# Patient Record
Sex: FEMALE | URBAN - METROPOLITAN AREA
[De-identification: names, ages, dates, MRNs, and addresses within clinical notes are randomized per-mention and may not be internally consistent; named-entity substitution may affect disease eponyms.]

---

## 2020-03-09 ENCOUNTER — APPOINTMENT (RX ONLY)
Dept: URBAN - METROPOLITAN AREA CLINIC 310 | Facility: CLINIC | Age: 50
Setting detail: DERMATOLOGY
End: 2020-03-09

## 2020-03-09 DIAGNOSIS — L259 CONTACT DERMATITIS AND OTHER ECZEMA, UNSPECIFIED CAUSE: ICD-10-CM

## 2020-03-09 PROBLEM — L23.9 ALLERGIC CONTACT DERMATITIS, UNSPECIFIED CAUSE: Status: ACTIVE | Noted: 2020-03-09

## 2020-03-09 PROCEDURE — 95044 PATCH/APPLICATION TESTS: CPT

## 2020-03-09 PROCEDURE — ? PATCH TESTING

## 2020-03-09 NOTE — PROCEDURE: PATCH TESTING
Detail Level: None
Consent: Verbal consent obtained, risks reviewed including but not limited to rash, itching, allergic reaction, systemic rash, remote possiblity of anaphylaxis to allergen.
Post-Care Instructions: I reviewed with the patient in detail post-care instructions. Patient should not sweat, pick at, or get the patches wet for 48 hours.
Number Of Patches (Maximum Allowable Per Dos By Cms Is 90): 79

## 2020-03-09 NOTE — HPI: TESTING (PATCH TESTING)
Have You Had Previous Patch Testing In The Past?: none
Who Is Your Referring Doctor?: Dr. Jamila Vaughn
What Is Your Occupation?: Patient works from home as a  and only does work on the computer.
Additional History: Patient has never had a problem with sensitive skin or and other allergies. Patient went to [place] to visit family where she has been before and has never had any issues or reactions, Patient was given tiramcinolone and clobetasol ointment which helped minimally. In mid December she took had a hormone pellet injection which is the only thing she has changed or that is new.

## 2020-03-11 ENCOUNTER — APPOINTMENT (RX ONLY)
Dept: URBAN - METROPOLITAN AREA CLINIC 310 | Facility: CLINIC | Age: 50
Setting detail: DERMATOLOGY
End: 2020-03-11

## 2020-03-11 DIAGNOSIS — L259 CONTACT DERMATITIS AND OTHER ECZEMA, UNSPECIFIED CAUSE: ICD-10-CM

## 2020-03-11 PROBLEM — L23.9 ALLERGIC CONTACT DERMATITIS, UNSPECIFIED CAUSE: Status: ACTIVE | Noted: 2020-03-11

## 2020-03-11 PROCEDURE — 99212 OFFICE O/P EST SF 10 MIN: CPT

## 2020-03-11 PROCEDURE — ? PATCH TEST REMOVAL

## 2020-03-11 PROCEDURE — ? OTHER

## 2020-03-11 PROCEDURE — ? NORTH AMERICAN 80 PATCH TEST READING

## 2020-03-11 NOTE — PROCEDURE: [facility] [employer] 80 PATCH TEST READING
Allergen 70 Reaction: no reaction
Show Allergen Counseling In The Note?: No
Detail Level: Zone
Show Negative Results In The Note?: Yes
What Reading Time Point?: 48 hour
Number Of Patches Read: 79

## 2020-03-11 NOTE — PROCEDURE: OTHER
Other (Free Text): # Allergen Result\\n1 nickel sulfate hexahydrate -\\n2 balsam of peru (myroxylon pereirae resin) -\\n3 fragrance mix -\\n4 quaternium 15 -\\n5 neomycin sulfate -\\n6 budesonide -\\n7 cobalt (II) chloride hexahydrate -\\n8 b-mojl-bwhftrsthca formaldehyde resin -\\n9 4-phenylenediamine base -\\n10 potassium dichromate -\\n11 carba mix -\\n12 thiuram mix  -\\n13 diazolidinyl urea (Germall® II) -\\n14 paraben mix  -\\n15 black rubber mix - PPD -\\n16 imidazolidinyl urea (Germall® 115) -\\n17 mercapto mix -\\n18 tixocortol-21-pivalate -\\n19 2-mercaptobenzothiazole -\\n20 colophony -\\n21 bisphenol A epoxy resin -\\n22 ethylenediamine dihydrochloride -\\n23 amerchol L101 -\\n24 benzocaine -\\n25 bacitracin -\\n26 DMDM hydantoin -\\n27 Benzoic acid -\\n28 Ethylhexyl glycerol -\\n29 2-bromo-2-nitropropane-1,3-diol (bronopol™) -\\n30 lidocaine-HCl -\\n31 gold sodium thiosulfate -\\n32 methyldibromo glutaronitrile (MDBGN) -\\n33 disperse blue mix  -\\n34 hydrocortisone-17-butyrate -\\n35 fragrance mix II -\\n36 iodopropynyl butylcarbamate -\\n37 mixed dialkyl thioureas -\\n38 2-hydroxyethyl methacrylate (HEMA) -\\n39 decyl glucoside -\\n40 methyl methacrylate -\\n41 cinnamic aldehyde -\\n42 ethyl acrylate -\\n43 tea tree oil, oxidized -\\n44 4-chloro-3,5-xylenol (PCMX) -\\n45 propolis -\\n46 2-hydroxy-4-methoxybenzophenone (oxybenzone) -\\n47 tosylamide formaldehyde resin -\\n48 sesquiterpenelactone mix -\\n49 coconut diethanolamide (cocamide MILES) -\\n50 4-chloro-3-cresol (PCMC) -\\n51 benzalkonium chloride -\\n52 benzophenone-4 -\\n53 Polymyxin B sulfate -\\n54 sorbic acid -\\n55 cananga odorata (marianela bear) -\\n56 compositae mix -\\n57 ethyleneurea, melamine formaldehyde mix NT\\n58 sorbitan sesquioleate -\\n59 1,3-diphenylguanidine (DPG) -\\n60 cetylstearylalcohol -\\n61 Sodium benzoate -\\n62 triamcinolone acetonide -\\n63 clobetasol-17-propionate -\\n64 dl alpha tocopherol -\\n65 ethyl cyanoacrylate -\\n66 phenoxyethanol -\\n67 disperse orange-3 -\\n68 Lavender oil (lavandula angustifolia oil) -\\n69 butylhydroxytoluene (BHT) -\\n70 2-ethylhexyl-4-methoxycinnamate (octinoxate) -\\n71 benzyl alcohol -\\n72 formaldehyde -\\n73 methylchloroisothiazolinone/methylisothiazolinone -\\n74 methylisothiazolinone -\\n75 cocamidopropyl betaine -\\n76 dimethylaminopropylamine (DMAPA) -\\n77 oleamidopropyl dimethylamine -\\n78 propylene glycol -\\n79 amidoamine (stearamidopropyl dimethylamine) -\\n80 chlorhexidine digluconate - Other (Free Text): # Allergen Result\\n1 nickel sulfate hexahydrate -\\n2 balsam of peru (myroxylon pereirae resin) -\\n3 fragrance mix -\\n4 quaternium 15 -\\n5 neomycin sulfate -\\n6 budesonide -\\n7 cobalt (II) chloride hexahydrate -\\n8 n-qcif-bpjkpvfkowl formaldehyde resin -\\n9 4-phenylenediamine base -\\n10 potassium dichromate -\\n11 carba mix -\\n12 thiuram mix  -\\n13 diazolidinyl urea (Germall® II) -\\n14 paraben mix  -\\n15 black rubber mix - PPD -\\n16 imidazolidinyl urea (Germall® 115) -\\n17 mercapto mix -\\n18 tixocortol-21-pivalate -\\n19 2-mercaptobenzothiazole -\\n20 colophony -\\n21 bisphenol A epoxy resin -\\n22 ethylenediamine dihydrochloride -\\n23 amerchol L101 -\\n24 benzocaine -\\n25 bacitracin -\\n26 DMDM hydantoin -\\n27 Benzoic acid -\\n28 Ethylhexyl glycerol -\\n29 2-bromo-2-nitropropane-1,3-diol (bronopol™) -\\n30 lidocaine-HCl -\\n31 gold sodium thiosulfate -\\n32 methyldibromo glutaronitrile (MDBGN) -\\n33 disperse blue mix  -\\n34 hydrocortisone-17-butyrate -\\n35 fragrance mix II -\\n36 iodopropynyl butylcarbamate -\\n37 mixed dialkyl thioureas -\\n38 2-hydroxyethyl methacrylate (HEMA) -\\n39 decyl glucoside -\\n40 methyl methacrylate -\\n41 cinnamic aldehyde -\\n42 ethyl acrylate -\\n43 tea tree oil, oxidized -\\n44 4-chloro-3,5-xylenol (PCMX) -\\n45 propolis -\\n46 2-hydroxy-4-methoxybenzophenone (oxybenzone) -\\n47 tosylamide formaldehyde resin -\\n48 sesquiterpenelactone mix -\\n49 coconut diethanolamide (cocamide MILES) -\\n50 4-chloro-3-cresol (PCMC) -\\n51 benzalkonium chloride -\\n52 benzophenone-4 -\\n53 Polymyxin B sulfate -\\n54 sorbic acid -\\n55 cananga odorata (marianela bear) -\\n56 compositae mix -\\n57 ethyleneurea, melamine formaldehyde mix NT\\n58 sorbitan sesquioleate -\\n59 1,3-diphenylguanidine (DPG) -\\n60 cetylstearylalcohol -\\n61 Sodium benzoate -\\n62 triamcinolone acetonide -\\n63 clobetasol-17-propionate -\\n64 dl alpha tocopherol -\\n65 ethyl cyanoacrylate -\\n66 phenoxyethanol -\\n67 disperse orange-3 -\\n68 Lavender oil (lavandula angustifolia oil) -\\n69 butylhydroxytoluene (BHT) -\\n70 2-ethylhexyl-4-methoxycinnamate (octinoxate) -\\n71 benzyl alcohol -\\n72 formaldehyde -\\n73 methylchloroisothiazolinone/methylisothiazolinone -\\n74 methylisothiazolinone -\\n75 cocamidopropyl betaine -\\n76 dimethylaminopropylamine (DMAPA) -\\n77 oleamidopropyl dimethylamine -\\n78 propylene glycol -\\n79 amidoamine (stearamidopropyl dimethylamine) -\\n80 chlorhexidine digluconate -

## 2020-03-13 ENCOUNTER — APPOINTMENT (RX ONLY)
Dept: URBAN - METROPOLITAN AREA CLINIC 310 | Facility: CLINIC | Age: 50
Setting detail: DERMATOLOGY
End: 2020-03-13

## 2020-03-13 DIAGNOSIS — L259 CONTACT DERMATITIS AND OTHER ECZEMA, UNSPECIFIED CAUSE: ICD-10-CM

## 2020-03-13 PROBLEM — L23.9 ALLERGIC CONTACT DERMATITIS, UNSPECIFIED CAUSE: Status: ACTIVE | Noted: 2020-03-13

## 2020-03-13 PROCEDURE — 99213 OFFICE O/P EST LOW 20 MIN: CPT

## 2020-03-13 PROCEDURE — ? NORTH AMERICAN 80 PATCH TEST READING

## 2020-03-13 PROCEDURE — ? OTHER

## 2020-03-13 NOTE — PROCEDURE: [facility] 80 PATCH TEST READING
Allergen 28 Reaction: no reaction
What Reading Time Point?: 96 hour
Detail Level: Zone
Number Of Patches Read: 79
Show Negative Results In The Note?: Yes
Show Allergen Counseling In The Note?: No

## 2020-03-13 NOTE — PROCEDURE: OTHER
Other (Free Text): # Allergen Result\\n1 nickel sulfate hexahydrate -\\n2 balsam of peru (myroxylon pereirae resin) -\\n3 fragrance mix -\\n4 quaternium 15 -\\n5 neomycin sulfate 2+\\n6 budesonide -\\n7 cobalt (II) chloride hexahydrate -\\n8 g-jcdb-ookqddadufl formaldehyde resin -\\n9 4-phenylenediamine base -\\n10 potassium dichromate -\\n11 carba mix -\\n12 thiuram mix  -\\n13 diazolidinyl urea (Germall® II) -\\n14 paraben mix  -\\n15 black rubber mix - PPD -\\n16 imidazolidinyl urea (Germall® 115) -\\n17 mercapto mix -\\n18 tixocortol-21-pivalate -\\n19 2-mercaptobenzothiazole -\\n20 colophony -\\n21 bisphenol A epoxy resin -\\n22 ethylenediamine dihydrochloride -\\n23 amerchol L101 -\\n24 benzocaine -\\n25 bacitracin -\\n26 DMDM hydantoin -\\n27 Benzoic acid -\\n28 Ethylhexyl glycerol -\\n29 2-bromo-2-nitropropane-1,3-diol (bronopol™) -\\n30 lidocaine-HCl -\\n31 gold sodium thiosulfate -\\n32 methyldibromo glutaronitrile (MDBGN) -\\n33 disperse blue mix  -\\n34 hydrocortisone-17-butyrate -\\n35 fragrance mix II -\\n36 iodopropynyl butylcarbamate -\\n37 mixed dialkyl thioureas -\\n38 2-hydroxyethyl methacrylate (HEMA) -\\n39 decyl glucoside -\\n40 methyl methacrylate -\\n41 cinnamic aldehyde -\\n42 ethyl acrylate -\\n43 tea tree oil, oxidized -\\n44 4-chloro-3,5-xylenol (PCMX) -\\n45 propolis 2+\\n46 2-hydroxy-4-methoxybenzophenone (oxybenzone) -\\n47 tosylamide formaldehyde resin -\\n48 sesquiterpenelactone mix -\\n49 coconut diethanolamide (cocamide MILES) -\\n50 4-chloro-3-cresol (PCMC) -\\n51 benzalkonium chloride -\\n52 benzophenone-4 -\\n53 Polymyxin B sulfate -\\n54 sorbic acid -\\n55 cananga odorata (ylang ylang) -\\n56 compositae mix -\\n57 ethyleneurea, melamine formaldehyde mix NT\\n58 sorbitan sesquioleate -\\n59 1,3-diphenylguanidine (DPG) -\\n60 cetylstearylalcohol -\\n61 Sodium benzoate -\\n62 triamcinolone acetonide -\\n63 clobetasol-17-propionate -\\n64 dl alpha tocopherol -\\n65 ethyl cyanoacrylate -\\n66 phenoxyethanol -\\n67 disperse orange-3 -\\n68 Lavender oil (lavandula angustifolia oil) -\\n69 butylhydroxytoluene (BHT) -\\n70 2-ethylhexyl-4-methoxycinnamate (octinoxate) -\\n71 benzyl alcohol -\\n72 formaldehyde -\\n73 methylchloroisothiazolinone/methylisothiazolinone -\\n74 methylisothiazolinone -\\n75 cocamidopropyl betaine -\\n76 dimethylaminopropylamine (DMAPA) -\\n77 oleamidopropyl dimethylamine -\\n78 propylene glycol -\\n79 amidoamine (stearamidopropyl dimethylamine) -\\n80 chlorhexidine digluconate -\\n\\nShe is reacting to neomycin and propolis. She has not been using neosporin at all. Patient does use natural products (not denisse bees though) but this past month she has only been using vanicream. Discussed that this could be related to the hormone pellets based on the timing of these. To not have anymore at this time. She is to continue treating with her topical steroids and f/u with dr Jamila Vaughn in 3 months. Other (Free Text): # Allergen Result\\n1 nickel sulfate hexahydrate -\\n2 balsam of peru (myroxylon pereirae resin) -\\n3 fragrance mix -\\n4 quaternium 15 -\\n5 neomycin sulfate 2+\\n6 budesonide -\\n7 cobalt (II) chloride hexahydrate -\\n8 a-axsp-pwcxvbdxsoq formaldehyde resin -\\n9 4-phenylenediamine base -\\n10 potassium dichromate -\\n11 carba mix -\\n12 thiuram mix  -\\n13 diazolidinyl urea (Germall® II) -\\n14 paraben mix  -\\n15 black rubber mix - PPD -\\n16 imidazolidinyl urea (Germall® 115) -\\n17 mercapto mix -\\n18 tixocortol-21-pivalate -\\n19 2-mercaptobenzothiazole -\\n20 colophony -\\n21 bisphenol A epoxy resin -\\n22 ethylenediamine dihydrochloride -\\n23 amerchol L101 -\\n24 benzocaine -\\n25 bacitracin -\\n26 DMDM hydantoin -\\n27 Benzoic acid -\\n28 Ethylhexyl glycerol -\\n29 2-bromo-2-nitropropane-1,3-diol (bronopol™) -\\n30 lidocaine-HCl -\\n31 gold sodium thiosulfate -\\n32 methyldibromo glutaronitrile (MDBGN) -\\n33 disperse blue mix  -\\n34 hydrocortisone-17-butyrate -\\n35 fragrance mix II -\\n36 iodopropynyl butylcarbamate -\\n37 mixed dialkyl thioureas -\\n38 2-hydroxyethyl methacrylate (HEMA) -\\n39 decyl glucoside -\\n40 methyl methacrylate -\\n41 cinnamic aldehyde -\\n42 ethyl acrylate -\\n43 tea tree oil, oxidized -\\n44 4-chloro-3,5-xylenol (PCMX) -\\n45 propolis 2+\\n46 2-hydroxy-4-methoxybenzophenone (oxybenzone) -\\n47 tosylamide formaldehyde resin -\\n48 sesquiterpenelactone mix -\\n49 coconut diethanolamide (cocamide MILES) -\\n50 4-chloro-3-cresol (PCMC) -\\n51 benzalkonium chloride -\\n52 benzophenone-4 -\\n53 Polymyxin B sulfate -\\n54 sorbic acid -\\n55 cananga odorata (ylang ylang) -\\n56 compositae mix -\\n57 ethyleneurea, melamine formaldehyde mix NT\\n58 sorbitan sesquioleate -\\n59 1,3-diphenylguanidine (DPG) -\\n60 cetylstearylalcohol -\\n61 Sodium benzoate -\\n62 triamcinolone acetonide -\\n63 clobetasol-17-propionate -\\n64 dl alpha tocopherol -\\n65 ethyl cyanoacrylate -\\n66 phenoxyethanol -\\n67 disperse orange-3 -\\n68 Lavender oil (lavandula angustifolia oil) -\\n69 butylhydroxytoluene (BHT) -\\n70 2-ethylhexyl-4-methoxycinnamate (octinoxate) -\\n71 benzyl alcohol -\\n72 formaldehyde -\\n73 methylchloroisothiazolinone/methylisothiazolinone -\\n74 methylisothiazolinone -\\n75 cocamidopropyl betaine -\\n76 dimethylaminopropylamine (DMAPA) -\\n77 oleamidopropyl dimethylamine -\\n78 propylene glycol -\\n79 amidoamine (stearamidopropyl dimethylamine) -\\n80 chlorhexidine digluconate -\\n\\nShe is reacting to neomycin and propolis. She has not been using neosporin at all. Patient does use natural products (not denisse bees though) but this past month she has only been using vanicream. Discussed that this could be related to the hormone pellets based on the timing of these. To not have anymore at this time. She is to continue treating with her topical steroids and f/u with dr Jamila Vaughn in 3 months.